# Patient Record
(demographics unavailable — no encounter records)

---

## 2025-07-07 NOTE — PHYSICAL EXAM
[Normocephalic] : normocephalic [Atraumatic] : atraumatic [Supple] : supple [No Supraclavicular Adenopathy] : no supraclavicular adenopathy [Examined in the supine and seated position] : examined in the supine and seated position [No dominant masses] : no dominant masses left breast [No Nipple Retraction] : no left nipple retraction [No Nipple Discharge] : no left nipple discharge [No Axillary Lymphadenopathy] : no left axillary lymphadenopathy [No Edema] : no edema [No Rashes] : no rashes [No Ulceration] : no ulceration [de-identified] : Firm mobile palpable mass measuring 3 cm x 5 cm and 11:00 7 cm from the nipple, no axillary adenopathy noted on physical exam.

## 2025-07-07 NOTE — ASSESSMENT
[FreeTextEntry1] : 82-year-old female with a right breast invasive ductal carcinoma, ER positive, MO negative, HER2 negative

## 2025-07-07 NOTE — PLAN
[TextEntry] : Right breast diagnostic mammogram and sonogram to be done now as per radiology recommendations Bilateral breast MRI to be done now Patient navigation Patient to follow-up with primary care and pulmonology for surgical clearance Follow-up results of genetic testing Patient to follow-up after imaging completed for surgical planning

## 2025-07-07 NOTE — PAST MEDICAL HISTORY
[Menarche Age ____] : age at menarche was [unfilled] [Total Preg ___] : G[unfilled] [Live Births ___] : P[unfilled]  [History of Hormone Replacement Treatment] : has no history of hormone replacement treatment [FreeTextEntry5] : NO [FreeTextEntry6] : NO [FreeTextEntry7] : NO [FreeTextEntry8] : NO

## 2025-07-07 NOTE — HISTORY OF PRESENT ILLNESS
[FreeTextEntry1] : FARZANA is a 82 year old female, referred by Dr. Adal Cerrato ([facility]), who presents with her son and  as well as her daughter on FaceTime for initial evaluation regarding newly diagnosed right breast invasive ductal carcinoma, with DCIS, ER 80%, CA <1%, HER2 negative, found on screening imaging. The patient states at the time she was due for her screening mammogram & US in June, she had noted a palpable lump in her right breast UOQ. The patient underwent a right breast ultrasound core biopsy on 6/26/2025 of the 3.2 cm irregular mass yielding the malignant concordant findings. A note was made on the concordance report on the submitted outside study, there is an asymmetry in the anterior breast, 6 cm anterior to the malignancy (possibly stable), for which a diagnostic workup is recommended (compression spot and rolled views and sonography), at that time the right axilla should be reevaluated for adenopathy as well. Patient has Yazidism ancestry, denies family history of breast or ovarian cancer.  Due to the Yazidism ancestry and new diagnosis, I recommended genetic counseling for possible genetic testing today.  She is amenable.  We reviewed the diagnosis with the patient and her family members at length.  As per radiology, I recommended a repeat right breast diagnostic mammogram and sonogram.  I also recommended bilateral breast MRI to further evaluate the extent of disease as well as the contralateral breast and bilateral axilla.  The patient and family members understand and agree.  I also recommended that she follow-up with her primary care provider as well as her pulmonologist for future surgical clearance as she has not followed up with her pulmonologist in some time.  Of note, the patient would like to try for breast conservation if possible.  We will follow-up after imaging is completed.  Of note, the patient was extremely tearful during the visit.  I recommended a consultation with psych oncology.  The patient declined at this time.  She stated that she has good support system with her family.  The patients medical history is significant for COPD and HLD. She states she is followed by her PCP and pulmonology, but hasn't been to her pulmonologist recently. The patient states she was recommended to be on maintenance medications for her COPD; however, states she did not tolerate the inhalers well, so she hasn't been taking any medications.

## 2025-07-07 NOTE — DATA REVIEWED
[FreeTextEntry1] : 1/9/25 ([facility]) left breast cyst unchanged. Right breast fibrotic regions. 6 month follow up recommended.   6/14/25 ([facility]) b/l mammogram & US: dense breast tissue, 0.3cm rounded density noted in UOQ left breast corresponding to focus on sonogram, right breast fibrotic regions with mass 3 x 3cm previously 2 x 2cm. Enlarging right breast mass, surgical consult recomended. BIRADS 5   6/26/2025 ([facility]) right breast 11:00 US core BX, 3.2 cm mass (wing clip): Infiltrating moderately differentiated ductal carcinoma, ductal carcinoma in situ, intermediate grade, solid type. POSITIVE, 80% NUCLEAR STAINING WITH MODERATE INTENSITY, PROGESTERONE RECEPTOR (NH): NEGATIVE, LESS THAN 1% NUCLEAR STAINING. HER-2: NEGATIVE, 0 MEMBRANOUS STAINING, 5%, malignant concordant. On the submitted outside study, there is an asymmetry in the anterior breast, 6 cm anterior to the malignancy (possibly stable), for which a diagnostic workup is recommended (compression spot and rolled views and sonography), at that time the right axilla should be reevaluated for adenopathy as well.

## 2025-07-09 NOTE — DISCUSSION/SUMMARY
[FreeTextEntry1] : REASON FOR CONSULT Alix Yi is an 82-year-old female who was referred by Dr. Janelle Melendez for cancer genetic counseling and risk assessment due to a recent breast cancer diagnosis. She was accompanied by her  and son in person and her daughter via Facetime.  RELEVANT MEDICAL HISTORY Ms. Yi was recently diagnosed with right breast cancer in 2025 at 82 years old. Pathology report revealed infiltrating duct carcinoma and ductal carcinoma in situ, reported ER+/NE <1%/HER2-. Upcoming imaging and genetic testing will help to determine treatment plan.   OTHER MEDICAL AND SURGICAL HISTORY:  -	 Medical History: arthritis, pulmonary emphysema, lymphedema of leg, hyperlipidemia -	Surgical History: hernia repair   PAST OB/GYN HISTORY:  Obstetrical History:  Age at Menarche: 13 Menopausal with LMP at unknown age Age at First Live Birth: 24 Oral Contraceptive Use: No Hormone Replacement Therapy: No   CANCER SCREENING HISTORY:    Breast:  -	Mammography: last 2025, mixed density breast tissue was identified as well as a rounded density in the left breast. Surgical consultation was recommended.  -	Sonography: last 2025, fibrotic regions with two masses were identified in the right breast as well as an unchanged cyst in the left breast. Surgical consultation was recommended. -	MRI: To be scheduled. -	Biopsies: 2025-Right, IDC and DCIS GYN: -	Pelvic Examination: last 2-3 years ago, reportedly normal  -	Sonography: No -	CA-125: No Colon: -	Colonoscopy: last approximately 10 years ago, patient reports 1-2 colorectal polyps were identified. Patient reports a history of 1-2 colorectal polyps on previous colonoscopies.  -	Upper Endoscopy: No Skin:   -	FBSE: Yes, patient reports undergoing FBSEs in the past and having to excise skin lesions at a FBSE <2-3 years ago.  -	Lesions biopsied/removed: Yes, patient reports having two pre-cancerous skin lesions excised from her foot and lip within the last 2-3 years.     SOCIAL HISTORY: -	Tobacco-product use: Yes, former.  FAMILY HISTORY: Maternal ancestry was reported as Polish and Ashkenazi Roman Catholic and paternal ancestry was reported as Ashkenazi Roman Catholic. Consanguinity was denied.  A detailed family history of cancer was ascertained. Relevant diagnoses are detailed below and in the scanned pedigree.   To Ms. Yi's knowledge, no one in the family has had germline testing for cancer susceptibility.   	 	RISK ASSESSMENT: Ms. Yi's personal history of breast cancer and/or family history of unknown cancer is suggestive of an inherited predisposition to breast cancer and related cancers. Given that she plans to make surgical decisions based on results from genetic testing, we recommended the [facility] BRCAplus STAT Panel testing for genes associated with breast cancer (typically results within 5-12 days). This test analyzes 13 genes: MYNOR, BARD1, BRCA1, BRCA2, CDH1, CHEK2, NF1, PALB2, PTEN, RAD51C, RAD51D, STK11, and TP53.  Concurrent reflex genetic testing for genes associated with breast and gynecological cancer was also ordered. This test analyzes 19 genes: MYNOR, BARD1, BRCA1, BRCA2, BRIP1, CDH1, CHEK2, EPCAM, MLH1, MSH2, MSH6, NF1, PALB2, PMS2, PTEN, RAD51C, RAD51D, STK11, and TP53.  We discussed the risks, benefits and limitations, and implications of genetic testing. We also discussed the psychosocial implications of genetic testing. Possible test results were reviewed with Ms. Yi, along with associated medical management options.   Ms. Yi consented to the above-mentioned genetic testing panel. Blood was drawn in our laboratory and sent to [facility] today.  PLAN:  1.	Blood drawn today will be sent to [facility] for analysis.  2.	We will contact Ms. Yi once the results are available and will schedule a follow-up appointment, as needed. STAT results generally return in 10-12 days from the day the sample is received in the lab.  For any additional questions please call Cancer Genetics at [phone].    Ale Parr MS, [facility] Genetic Counselor, Cancer Genetics   CC:  Dr. Janelle Melendez

## 2025-07-16 NOTE — DISCUSSION/SUMMARY
[FreeTextEntry1] : RESULTS TRANSMISSION Alix Yi is an 82-year-old female who was called on 07/16/2025 for a discussion regarding her genetic testing results related to hereditary cancer predisposition.   Ms. Yi was originally seen at [facility] on 07/07/2025 for hereditary cancer predisposition risk assessment due to a recent breast cancer diagnosis. Ms. Yi decided to pursue genetic testing using the STAT BRCAplus panel with concurrent reflex to the BRCANext panel offered by [facility]. Initial STAT results were disclosed on 07/14/2025.  TEST RESULTS: NEGATIVE  No pathogenic (disease-causing) variants or VUSs were detected in the following genes:  MYNOR, BARD1, BRCA1, BRCA2, BRIP1, CDH1, CHEK2, EPCAM, MLH1, MSH2, MSH6, NF1, PALB2, PMS2, PTEN, RAD51C, RAD51D, STK11, and TP53.  RESULTS INTERPRETATION AND ASSESSMENT: Given Ms. Yi's personal and current reported family history of cancer, and her negative genetic test results, the following screening guidelines and risk-reducing recommendations were discussed:  BREAST:  - Long-term management and surveillance should be based on Ms. Yi's on- or post-treatment protocol as recommended by her breast care team.  OTHER:  - In the absence of other indications, Ms. Yi should practice age-appropriate cancer screening of other organ systems as recommended for the general population.  We also discussed that, while no cause of the patient's personal and family history of cancer was identified, this result, while reassuring, does entirely not rule out a hereditary cancer risk in the patient. It is possible, although unlikely, the patient has a mutation in one of the genes tested that is not detectable by this analysis, or has a mutation in a different gene, either known or unknown. It is also possible there is a hereditary cancer predisposition in the family, but the patient did not inherit it.  We informed Ms. Yi that our knowledge of genetics and inherited cancer conditions is changing rapidly. Therefore, we recommended that Ms. Yi contact our office, every 2 to 3 years, to discuss relevant advances in cancer genetics.  We emphasized the importance of re-contacting us with updates regarding her personal and family history of cancer as well as any updates regarding additional cancer genetic test results performed for the patient and/or family members.  Such updates could possibly change our risk assessment and recommendations.   PLAN: 1.	See above for recommended screening and risk-reduction strategies. 2.	Patient informed consult note(s) will be available through their [employer] patient portal and genetic test results will be released via [employer]'s laboratory portal.  3.	Ms. Yi was encouraged to contact us every 2-3 years to discuss relevant advances in cancer genetics, or sooner if there are any changes in her personal or family history of cancer.   For any additional questions please call Cancer Genetics at [phone].    Ale Parr MS, [facility] Genetic Counselor, Cancer Genetics   CC:  Patient Dr. Janelle Melendez

## 2025-07-21 NOTE — END OF VISIT
[FreeTextEntry3] : NP participated on patient's encounter.NP participated on patient's encounter.  I, personally performed the evaluation and management (E/M) services for this new patient.  That E/M includes conducting the initial examination, assessing all conditions, and establishing the plan of care.  Today, my ACP was here to observe and participate on evaluation and management services for this patient to be followed going forward.  [Time Spent: ___ minutes] : I have spent [unfilled] minutes of time on the encounter which excludes teaching and separately reported services.

## 2025-07-21 NOTE — ASSESSMENT
[FreeTextEntry1] : 82 yof nonsmoker with medical history of newly diagnosed right breast invasive ductal carcinoma, with DCIS on 06/2025 ER 80%, [place] <1%, HER2 negative, found on screening imaging, COPD, [place], and HLD. Pt is refereed by Janelle Parekh for preoperative clearance.   Reviewed: Office note from Dr. Sommer (pulmonary). Print out of chest CT read from 2017.  Former smoker with probably mild copd. Exercise tolerance is diffcult to assess dues to muskuloskeletal pains.  Will order CXR to assess for any major abnormality which would prevent her to undergo lumpectomy and PFT to assess FEV1, and generally lung function to guide perioperative care. Need for chest CT considering mentioning of nodule 4 years ago will be discussed after surgery if CXR is unremarkable.  Telephone visit to discuss results of cxt and pft on 7/28 at 1 PM. Requesting clinical notes to be fax to PCP

## 2025-07-21 NOTE — PHYSICAL EXAM
[No Acute Distress] : no acute distress [Well Groomed] : well groomed [Normal Oropharynx] : normal oropharynx [Normal Appearance] : normal appearance [Supple] : supple [Normal Rate/Rhythm] : normal rate/rhythm [Normal S1, S2] : normal s1, s2 [No Resp Distress] : no resp distress [Clear to Auscultation Bilaterally] : clear to auscultation bilaterally [Soft] : soft [No Clubbing] : no clubbing [No Cyanosis] : no cyanosis [Oriented x3] : oriented x3 [TextBox_80] : palpable lump on right breast [TextBox_99] : walking with cane [TextBox_105] : lymphedema [TextBox_125] : lentigo like skin changes [TextBox_132] : heard of hearing

## 2025-07-21 NOTE — ASSESSMENT
[FreeTextEntry1] : 82 yof nonsmoker with medical history of newly diagnosed right breast invasive ductal carcinoma, with DCIS on 06/2025 ER 80%, [place] <1%, HER2 negative, found on screening imaging, COPD, [place], and HLD. Pt is refereed by Janelle Parekh for preoperative clearance.   Reviewed: Office note from Dr. Sommer (pulmonary). Print out of chest CT read from 2017.  Former smoker with probably mild copd. Exercise tolerance is diffcult to assess dues to muskuloskeletal pains.  Will order CXR to assess for any major abnormality which would prevent her to undergo lumpectomy and PFT to assess FEV1, and generally lung function to guide perioperative care. Need for chest CT considering mentioning of nodule 4 years ago will be discussed after surgery if CXR is unremarkable.  Telephone visit to discuss results of cxt and pft on 7/28 at 1 PM. Requesting clinical notes to be fax to PCP

## 2025-07-21 NOTE — HISTORY OF PRESENT ILLNESS
[Former] : former [Current] : current [TextBox_4] : 82 yof nonsmoker with medical history of newly diagnosed right breast invasive ductal carcinoma, with DCIS on 06/2025 ER 80%, [place] <1%, HER2 negative, found on screening imaging, COPD, [place], and HLD. Pt is refereed by Janelle Parekh for preoperative clearance.   Previously used to see Dr Sommer Pulmonologist, her last visit was 2021. She was started on Anoro in 2017 which caused voice hoarseness, 2 other inhalers were started after that, but both caused the same symptoms. Since then she has not been on inhalers.  Reports hot and humid weather can trigger heavier breathing. She has minimal occasional nonproductive cough at night, which does not bother her. Exercise tolerance: able to ambulate 1 block with a cane however limited due to knee, hip and back pain and lymphedema.   Chronic lymphedema for the past 40 years, saw vascular in the past.  Had bacterial pneumonia 30 years ago  Had covid twice 07/2022, 07/2024, she was prescribed Paxlovid.  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  Pets: no    [TextBox_11] : 1 ppd [TextBox_13] : 20 [YearQuit] : 1988

## 2025-07-21 NOTE — REVIEW OF SYSTEMS
[SOB on Exertion] : sob on exertion [GERD] : gerd [Arthralgias] : arthralgias [Negative] : Endocrine [Fatigue] : no fatigue [Poor Appetite] : no poor appetite [Chest Tightness] : no chest tightness [Wheezing] : no wheezing [Seasonal Allergies] : no seasonal allergies [TextBox_94] : uses cane

## 2025-07-21 NOTE — HISTORY OF PRESENT ILLNESS
[Former] : former [Current] : current [TextBox_4] : 82 yof nonsmoker with medical history of newly diagnosed right breast invasive ductal carcinoma, with DCIS on 06/2025 ER 80%, [place] <1%, HER2 negative, found on screening imaging, COPD, [place], and HLD. Pt is refereed by Janelle Parekh for preoperative clearance.   Previously used to see Dr Sommer Pulmonologist, her last visit was 2021. She was started on Anoro in 2017 which caused voice hoarseness, 2 other inhalers were started after that, but both caused the same symptoms. Since then she has not been on inhalers.  Reports hot and humid weather can trigger heavier breathing. She has minimal occasional nonproductive cough at night, which does not bother her. Exercise tolerance: able to ambulate 1 block with a cane however limited due to knee, hip and back pain and lymphedema.   Chronic lymphedema for the past 40 years, saw vascular in the past.  Had bacterial pneumonia 30 years ago  Had covid twice 07/2022, 07/2024, she was prescribed Paxlovid.  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  Pets: no    [TextBox_11] : 1 ppd [TextBox_13] : 20 [YearQuit] : 1988